# Patient Record
Sex: MALE | Race: WHITE
[De-identification: names, ages, dates, MRNs, and addresses within clinical notes are randomized per-mention and may not be internally consistent; named-entity substitution may affect disease eponyms.]

---

## 2020-04-15 ENCOUNTER — HOSPITAL ENCOUNTER (INPATIENT)
Dept: HOSPITAL 47 - EC | Age: 25
LOS: 3 days | Discharge: HOME | DRG: 603 | End: 2020-04-18
Attending: INTERNAL MEDICINE | Admitting: INTERNAL MEDICINE
Payer: MEDICAID

## 2020-04-15 VITALS — BODY MASS INDEX: 18.8 KG/M2

## 2020-04-15 DIAGNOSIS — Z91.018: ICD-10-CM

## 2020-04-15 DIAGNOSIS — Z88.1: ICD-10-CM

## 2020-04-15 DIAGNOSIS — Z82.49: ICD-10-CM

## 2020-04-15 DIAGNOSIS — F31.9: ICD-10-CM

## 2020-04-15 DIAGNOSIS — Z79.899: ICD-10-CM

## 2020-04-15 DIAGNOSIS — L03.114: Primary | ICD-10-CM

## 2020-04-15 DIAGNOSIS — J45.909: ICD-10-CM

## 2020-04-15 DIAGNOSIS — Z87.09: ICD-10-CM

## 2020-04-15 LAB
ALBUMIN SERPL-MCNC: 4.7 G/DL (ref 3.5–5)
ALP SERPL-CCNC: 94 U/L (ref 38–126)
ALT SERPL-CCNC: 15 U/L (ref 4–49)
ANION GAP SERPL CALC-SCNC: 9 MMOL/L
AST SERPL-CCNC: 39 U/L (ref 17–59)
BASOPHILS # BLD AUTO: 0 K/UL (ref 0–0.2)
BASOPHILS NFR BLD AUTO: 0 %
BUN SERPL-SCNC: 13 MG/DL (ref 9–20)
CALCIUM SPEC-MCNC: 9.3 MG/DL (ref 8.4–10.2)
CHLORIDE SERPL-SCNC: 104 MMOL/L (ref 98–107)
CO2 SERPL-SCNC: 25 MMOL/L (ref 22–30)
EOSINOPHIL # BLD AUTO: 0.1 K/UL (ref 0–0.7)
EOSINOPHIL NFR BLD AUTO: 2 %
ERYTHROCYTE [DISTWIDTH] IN BLOOD BY AUTOMATED COUNT: 5.27 M/UL (ref 4.3–5.9)
ERYTHROCYTE [DISTWIDTH] IN BLOOD: 14.3 % (ref 11.5–15.5)
GLUCOSE SERPL-MCNC: 96 MG/DL (ref 74–99)
HCT VFR BLD AUTO: 42.5 % (ref 39–53)
HGB BLD-MCNC: 14.1 GM/DL (ref 13–17.5)
LYMPHOCYTES # SPEC AUTO: 1.5 K/UL (ref 1–4.8)
LYMPHOCYTES NFR SPEC AUTO: 29 %
MCH RBC QN AUTO: 26.8 PG (ref 25–35)
MCHC RBC AUTO-ENTMCNC: 33.2 G/DL (ref 31–37)
MCV RBC AUTO: 80.6 FL (ref 80–100)
MONOCYTES # BLD AUTO: 0.3 K/UL (ref 0–1)
MONOCYTES NFR BLD AUTO: 6 %
NEUTROPHILS # BLD AUTO: 3.1 K/UL (ref 1.3–7.7)
NEUTROPHILS NFR BLD AUTO: 62 %
PLATELET # BLD AUTO: 68 K/UL (ref 150–450)
POTASSIUM SERPL-SCNC: 4.4 MMOL/L (ref 3.5–5.1)
PROT SERPL-MCNC: 8.1 G/DL (ref 6.3–8.2)
SODIUM SERPL-SCNC: 138 MMOL/L (ref 137–145)
WBC # BLD AUTO: 5.1 K/UL (ref 3.8–10.6)

## 2020-04-15 PROCEDURE — 80053 COMPREHEN METABOLIC PANEL: CPT

## 2020-04-15 PROCEDURE — 76937 US GUIDE VASCULAR ACCESS: CPT

## 2020-04-15 PROCEDURE — 80202 ASSAY OF VANCOMYCIN: CPT

## 2020-04-15 PROCEDURE — 86140 C-REACTIVE PROTEIN: CPT

## 2020-04-15 PROCEDURE — 82565 ASSAY OF CREATININE: CPT

## 2020-04-15 PROCEDURE — 96365 THER/PROPH/DIAG IV INF INIT: CPT

## 2020-04-15 PROCEDURE — 36415 COLL VENOUS BLD VENIPUNCTURE: CPT

## 2020-04-15 PROCEDURE — 36410 VNPNXR 3YR/> PHY/QHP DX/THER: CPT

## 2020-04-15 PROCEDURE — 84439 ASSAY OF FREE THYROXINE: CPT

## 2020-04-15 PROCEDURE — 99284 EMERGENCY DEPT VISIT MOD MDM: CPT

## 2020-04-15 PROCEDURE — 84443 ASSAY THYROID STIM HORMONE: CPT

## 2020-04-15 PROCEDURE — 96366 THER/PROPH/DIAG IV INF ADDON: CPT

## 2020-04-15 PROCEDURE — 87040 BLOOD CULTURE FOR BACTERIA: CPT

## 2020-04-15 PROCEDURE — 85025 COMPLETE CBC W/AUTO DIFF WBC: CPT

## 2020-04-15 RX ADMIN — KETOROLAC TROMETHAMINE PRN MG: 30 INJECTION, SOLUTION INTRAMUSCULAR at 23:14

## 2020-04-15 NOTE — P.HPIM
History of Present Illness


H&P Date: 04/15/20


Chief Complaint: left hand swelling and reddness





24 year old male with history of IV drug abuse





patient admits to relapsing last week , when he trying to inject oxycodone but 

missed the vein of his forearm. he admits to having bactremia in the past due to

IVDA


he claims that he was fine up until 2 days ago when he noticed swelling and 

erythema of his left hand with progressive swelling with painful and limited 

range of motion of his  left hand fingers. he denies any numbness or tingling 

over his left hand, denies fever, but reports chills. he denies any history of 

endocarditis. he otherwise denies any other injuries to his left hand, denies 

any URI symptoms , abd pain , nausea or vomiting, denies any chest pain or 

trouble breathing. denies sick contact with others. denies any visions changes, 

or urinary symptoms. denies abd pain , or changes in bowel habits. 





patient reports shooting pain 10/10 in severity when he tries to move his left 

fingers radaiting proximally to his left forearm. 





Review of Systems





 











Pertinent positives as noted in HPI. All other systems were reviewed and are 

negative


 





Past Medical History


Past Medical History: No Reported History


Additional Past Medical History / Comment(s): IVDA


History of Any Multi-Drug Resistant Organisms: None Reported


Past Surgical History: No Surgical Hx Reported


Past Psychological History: Bipolar


Smoking Status: Never smoker


Past Alcohol Use History: Rare


Past Drug Use History: IV Drug Use, Marijuana, Opiates





- Past Family History


  ** Mother


Family Medical History: Hypertension


Additional Family Medical History / Comment(s): Kidney issue, OCD/Bipolar and 

other mental health issues





Medications and Allergies


                                Home Medications











 Medication  Instructions  Recorded  Confirmed  Type


 


Buprenorphine HCl/Naloxone HCl 1 tab SL BID 04/15/20 04/15/20 History





[Zubsolv 5.7-1.4 mg Tablet Sl]    


 


lamoTRIgine 150 mg PO DAILY 04/15/20 04/15/20 History








                                    Allergies











Allergy/AdvReac Type Severity Reaction Status Date / Time


 


tree nut Allergy  Anaphylaxis Verified 04/15/20 20:42


 


vancomycin AdvReac  Rapid Verified 04/15/20 22:08





   Heart Rate  














Physical Exam


Vitals: 


                                   Vital Signs











  Temp Pulse Resp BP Pulse Ox


 


 04/15/20 20:00  98 F  60  20  136/99  99


 


 04/15/20 16:10  97.9 F  62  17  134/88  100








                                Intake and Output











 04/15/20 04/15/20 04/15/20





 06:59 14:59 22:59


 


Other:   


 


  Weight   70.307 kg














Constitutional:          No acute distress, conversant, pleasant


Eyes:      Anicteric sclerae, moist conjunctiva, no lid-lag


         Pupils equal round reactive to light





ENMT:      NC/AT


         Oropharynx clear, no erythema, or exudates





Neck:      Supple, FROM, no masses, or JVD


         No carotid bruits


         No thyromegaly





Lungs:      Clear to auscultation


         Clear to percussion


         Normal respiratory effort, no accessory muscle use 





Cardiovascular:      Heart regular in rate and rhythm, 


         No murmurs, gallops, or rubs


         No peripheral edema





Abdominal:       Soft


         Nontender, no guarding, rebound or rigidity


         Abdomen moving with respiration


         Normoactive bowel sounds


         No hepatomegaly, No splenomegaly


         No palpable mass 


         No abdominal wall hernia noted 





Skin:      Normal temperature, tone, texture, turgor


         No induration


         No subcutaneous nodules 


         No rash, lesions


         No ulcers





Extremities:      erythema and swelling over the left hand limited range of 

motion of his left fingers, capillary refill immediate , no open wounds no 

drainage. 


         No digital cyanosis 


         No clubbing


         Pedal pulses intact and symmetrical


         Radial pulses intact and symmetrical 


         No calf tenderness 





Psychiatric:      Alert and oriented to person, place and time


         Appropriate affect


         fair judgement   


      


Neuro      Muscles Strength 5/5 in all 4 extremities , limitation over left hand

due to above


         Sensation to light touch grossly present throughout


         Cranial nerves II-XII grossly intact


         No focal sensory deficits


Lymphatics:       no palpable cervical or supraclavicular , or inguinal lymph 

nodes  





Results


CBC & Chem 7: 


                                 04/15/20 18:25





                                 04/15/20 19:42


Labs: 


                  Abnormal Lab Results - Last 24 Hours (Table)











  04/15/20 04/15/20 Range/Units





  18:25 19:42 


 


Plt Count  68 L   (150-450)  k/uL


 


Creatinine   0.65 L  (0.66-1.25)  mg/dL














Assessment and Plan


Assessment: 





24 year old male with IVDA comes in after missing a vein during IV drug use, a 

week ago, and now coming with swelling and erythema over left hand of 2 days 

duration . suspected cellulitis anticipated length of stay > 2 midnights. 





cellulitis of the left hand 2/2 IVDA 


follow up blood cultures


empiric use of vancomycin


monitor for red man syndrome which patient reports in the past. 


toradol for pain control and swelling 


elevated left hand


tylenol for fever


IVF hydration with normal saline. 


patient counseled to avoid drug of abuse


hand surgery consult 





DVT ppx mechanical 


 


CODE STATUS:full code


Discussed with: Patient, ER, RN


Anticipated length of stay  > than 2 midnights


Anticipated discharge place: home


A total of 70 minutes was spent on the care of this complex patient more than 

50% of the time was spent in counseling and care coordination.

## 2020-04-15 NOTE — ED
General Adult HPI





- General


Source: patient


Mode of arrival: ambulatory


Limitations: no limitations





<Arcelia Valadez - Last Filed: 04/15/20 20:15>





<Sumi Castrejon - Last Filed: 04/18/20 13:23>





- General


Chief complaint: Extremity Injury, Upper


Stated complaint: lt hand infection


Time Seen by Provider: 04/15/20 16:49





- History of Present Illness


Initial comments: 


24-year-old male patient presents to the emergency department today for 

evaluation of left hand pain, swelling, redness.  Patient states that symptoms 

started 2-3 days ago after he shot up with heroin.  Patient states that he has 

significant pain to his thumb especially with any type of movement.  Denies 

numbness or tingling to the hand.  He denies any fever or chills.  Denies any 

other medical problems. Patient denies any recent rash, cough, shortness of 

breath, chest pain, abdominal pain, nausea, vomiting, diarrhea, constipation, 

back pain, numbness, tingling, dizziness, weakness, hematuria, dysuria, urinary 

urgency, urinary frequency, headache, visual changes, or any other complaints.


 (Arcelia Valadez)





- Related Data


                                Home Medications











 Medication  Instructions  Recorded  Confirmed


 


Buprenorphine HCl/Naloxone HCl 1 tab SL BID 04/15/20 04/15/20





[Zubsolv 5.7-1.4 mg Tablet Sl]   


 


lamoTRIgine 150 mg PO DAILY 04/15/20 04/15/20











                                    Allergies











Allergy/AdvReac Type Severity Reaction Status Date / Time


 


tree nut Allergy  Anaphylaxis Verified 04/15/20 20:42


 


vancomycin AdvReac  Rapid Verified 04/15/20 22:08





   Heart Rate  














Review of Systems


ROS Other: All systems not noted in ROS Statement are negative.





<Arcelia Valadez - Last Filed: 04/15/20 20:15>


ROS Other: All systems not noted in ROS Statement are negative.





<Sumi Castrejon - Last Filed: 04/18/20 13:23>


ROS Statement: 


Those systems with pertinent positive or pertinent negative responses have been 

documented in the HPI.








Past Medical History


Past Medical History: No Reported History


History of Any Multi-Drug Resistant Organisms: None Reported


Past Surgical History: No Surgical Hx Reported


Past Psychological History: Bipolar


Smoking Status: Never smoker


Past Alcohol Use History: Rare


Past Drug Use History: IV Drug Use, Marijuana, Opiates





<Arcelia Valadez M - Last Filed: 04/15/20 20:15>





General Exam


Limitations: no limitations


General appearance: alert, in no apparent distress, other (This is a well-

developed, well-nourished adult male patient in no acute distress.  Vital signs 

upon presentation are temperature 97.9F, pulse 62, respirations 17, blood 

pressure 134/88, pulse ox 100% on room air.)


Eye exam: Present: normal appearance, PERRL, EOMI.  Absent: scleral icterus, 

conjunctival injection, periorbital swelling


ENT exam: Present: normal exam, normal oropharynx, mucous membranes moist


Respiratory exam: Present: normal lung sounds bilaterally.  Absent: respiratory 

distress, wheezes, rales, rhonchi, stridor


Cardiovascular Exam: Present: regular rate, normal rhythm, normal heart sounds. 

Absent: systolic murmur, diastolic murmur, rubs, gallop, clicks


GI/Abdominal exam: Present: soft, normal bowel sounds.  Absent: distended, 

tenderness, guarding, rebound, rigid


Extremities exam: Present: full ROM, tenderness (Left thumb, left thenar 

eminence), normal capillary refill, other (There is soft tissue swelling left 

hand, erythema overlying. Swelling is worse over the thenar eminence and the 

thumb. ).  Absent: normal inspection, pedal edema, joint swelling, calf 

tenderness


Neurological exam: Present: alert, oriented X3, CN II-XII intact


Psychiatric exam: Present: normal affect, normal mood


Skin exam: Present: warm, dry, intact, normal color.  Absent: rash





<Arcelia Valadez M - Last Filed: 04/15/20 20:15>





Course





                                   Vital Signs











  04/15/20 04/15/20





  16:10 20:00


 


Temperature 97.9 F 98 F


 


Pulse Rate 62 60


 


Respiratory 17 20





Rate  


 


Blood Pressure 134/88 136/99


 


O2 Sat by Pulse 100 99





Oximetry  














Medical Decision Making





- Lab Data


Result diagrams: 


                                 04/15/20 18:25





                                 04/15/20 19:42





<Arcelia Valadez - Last Filed: 04/15/20 20:15>





- Lab Data


Result diagrams: 


                                 04/18/20 06:16





                                 04/18/20 06:16





<Sumi Castrejon - Last Filed: 04/18/20 13:23>





- Medical Decision Making





24-year-old pleasant male patient presents to the emergency department today for

evaluation of left hand redness, swelling, pain.  Physical examination did 

reveal swelling over the left hand more significant over the left thenar 

eminence in the thumb.  There is fusiform tenderness over the thumb.  Patient 

does have a history of IV drug use and admits to shooting up heroin 3 days ago. 

Labs reviewed and revealed normal white blood cell count.  We'll admit to the 

hospital for IV antibiotics. Dr. Hinkle is the patient's primary care physician 

he requests [facility] physician group. Dr. Jiménez accepts admission and requests 

consult to hand surgery.  (Arcelia Valadez)


I was available for consultation in the emergency department.  The history and 

physical exam were done by the midlevel provider. I was consulted for this 

patients care. I reviewed the case with the midlevel provider and based on 

their presentation of the patient, I agree with the assessment, medical decision

making and plan of care as documented. I evaluated the patient myself and agree 

to hospital admission.


Chart was dictated using Dragon dictation software.  Attempts were made to 

correct any dictation errors however some typographical errors may persist. 


Patient was seen during a national state of emergency due to the Covid-19 

pandemic. 


 (Sumi Castrejon)





- Lab Data





                                   Lab Results











  04/15/20 04/15/20 04/16/20 Range/Units





  18:25 19:42 06:20 


 


WBC  5.1   6.9  (3.8-10.6)  k/uL


 


RBC  5.27   4.59  (4.30-5.90)  m/uL


 


Hgb  14.1   12.3 L  (13.0-17.5)  gm/dL


 


Hct  42.5   37.2 L  (39.0-53.0)  %


 


MCV  80.6   81.0  (80.0-100.0)  fL


 


MCH  26.8   26.7  (25.0-35.0)  pg


 


MCHC  33.2   32.9  (31.0-37.0)  g/dL


 


RDW  14.3   14.7  (11.5-15.5)  %


 


Plt Count  68 L   171  D  (150-450)  k/uL


 


Neutrophils %  62   58  %


 


Lymphocytes %  29   30  %


 


Monocytes %  6   7  %


 


Eosinophils %  2   2  %


 


Basophils %  0   1  %


 


Neutrophils #  3.1   4.0  (1.3-7.7)  k/uL


 


Lymphocytes #  1.5   2.1  (1.0-4.8)  k/uL


 


Monocytes #  0.3   0.5  (0-1.0)  k/uL


 


Eosinophils #  0.1   0.1  (0-0.7)  k/uL


 


Basophils #  0.0   0.0  (0-0.2)  k/uL


 


Manual Slide Review  Performed    


 


Sodium   138   (137-145)  mmol/L


 


Potassium   4.4   (3.5-5.1)  mmol/L


 


Chloride   104   ()  mmol/L


 


Carbon Dioxide   25   (22-30)  mmol/L


 


Anion Gap   9   mmol/L


 


BUN   13   (9-20)  mg/dL


 


Creatinine   0.65 L   (0.66-1.25)  mg/dL


 


Est GFR (CKD-EPI)AfAm   >90   (>60 ml/min/1.73 sqM)  


 


Est GFR (CKD-EPI)NonAf   >90   (>60 ml/min/1.73 sqM)  


 


Glucose   96   (74-99)  mg/dL


 


Calcium   9.3   (8.4-10.2)  mg/dL


 


Total Bilirubin   0.5   (0.2-1.3)  mg/dL


 


AST   39   (17-59)  U/L


 


ALT   15   (4-49)  U/L


 


Alkaline Phosphatase   94   ()  U/L


 


Total Protein   8.1   (6.3-8.2)  g/dL


 


Albumin   4.7   (3.5-5.0)  g/dL


 


TSH     (0.465-4.680)  mIU/L


 


Free T4     (0.78-2.19)  ng/dL


 


Vancomycin Trough     ug/mL














  04/16/20 04/17/20 04/17/20 Range/Units





  06:20 07:08 07:08 


 


WBC     (3.8-10.6)  k/uL


 


RBC     (4.30-5.90)  m/uL


 


Hgb     (13.0-17.5)  gm/dL


 


Hct     (39.0-53.0)  %


 


MCV     (80.0-100.0)  fL


 


MCH     (25.0-35.0)  pg


 


MCHC     (31.0-37.0)  g/dL


 


RDW     (11.5-15.5)  %


 


Plt Count     (150-450)  k/uL


 


Neutrophils %     %


 


Lymphocytes %     %


 


Monocytes %     %


 


Eosinophils %     %


 


Basophils %     %


 


Neutrophils #     (1.3-7.7)  k/uL


 


Lymphocytes #     (1.0-4.8)  k/uL


 


Monocytes #     (0-1.0)  k/uL


 


Eosinophils #     (0-0.7)  k/uL


 


Basophils #     (0-0.2)  k/uL


 


Manual Slide Review     


 


Sodium  141    (137-145)  mmol/L


 


Potassium  4.1    (3.5-5.1)  mmol/L


 


Chloride  105    ()  mmol/L


 


Carbon Dioxide  25    (22-30)  mmol/L


 


Anion Gap  11    mmol/L


 


BUN  13    (9-20)  mg/dL


 


Creatinine  0.76    (0.66-1.25)  mg/dL


 


Est GFR (CKD-EPI)AfAm  >90    (>60 ml/min/1.73 sqM)  


 


Est GFR (CKD-EPI)NonAf  >90    (>60 ml/min/1.73 sqM)  


 


Glucose  87    (74-99)  mg/dL


 


Calcium  9.0    (8.4-10.2)  mg/dL


 


Total Bilirubin  0.5    (0.2-1.3)  mg/dL


 


AST  41    (17-59)  U/L


 


ALT  14    (4-49)  U/L


 


Alkaline Phosphatase  77    ()  U/L


 


Total Protein  6.9    (6.3-8.2)  g/dL


 


Albumin  4.0    (3.5-5.0)  g/dL


 


TSH    5.540 H  (0.465-4.680)  mIU/L


 


Free T4    1.36  (0.78-2.19)  ng/dL


 


Vancomycin Trough   13.9   ug/mL














Disposition


Decision to Admit Reason: Admit from EC


Decision Date: 04/15/20


Decision Time: 20:21





<Arcelia Valadez - Last Filed: 04/15/20 20:15>





<Sumi Castrejon - Last Filed: 04/18/20 13:23>


Clinical Impression: 


 Cellulitis of left hand





Disposition: ADMITTED AS IP TO THIS [facility]


Condition: Serious

## 2020-04-16 LAB
ALBUMIN SERPL-MCNC: 4 G/DL (ref 3.5–5)
ALP SERPL-CCNC: 77 U/L (ref 38–126)
ALT SERPL-CCNC: 14 U/L (ref 4–49)
ANION GAP SERPL CALC-SCNC: 11 MMOL/L
AST SERPL-CCNC: 41 U/L (ref 17–59)
BASOPHILS # BLD AUTO: 0 K/UL (ref 0–0.2)
BASOPHILS NFR BLD AUTO: 1 %
BUN SERPL-SCNC: 13 MG/DL (ref 9–20)
CALCIUM SPEC-MCNC: 9 MG/DL (ref 8.4–10.2)
CHLORIDE SERPL-SCNC: 105 MMOL/L (ref 98–107)
CO2 SERPL-SCNC: 25 MMOL/L (ref 22–30)
EOSINOPHIL # BLD AUTO: 0.1 K/UL (ref 0–0.7)
EOSINOPHIL NFR BLD AUTO: 2 %
ERYTHROCYTE [DISTWIDTH] IN BLOOD BY AUTOMATED COUNT: 4.59 M/UL (ref 4.3–5.9)
ERYTHROCYTE [DISTWIDTH] IN BLOOD: 14.7 % (ref 11.5–15.5)
GLUCOSE SERPL-MCNC: 87 MG/DL (ref 74–99)
HCT VFR BLD AUTO: 37.2 % (ref 39–53)
HGB BLD-MCNC: 12.3 GM/DL (ref 13–17.5)
LYMPHOCYTES # SPEC AUTO: 2.1 K/UL (ref 1–4.8)
LYMPHOCYTES NFR SPEC AUTO: 30 %
MCH RBC QN AUTO: 26.7 PG (ref 25–35)
MCHC RBC AUTO-ENTMCNC: 32.9 G/DL (ref 31–37)
MCV RBC AUTO: 81 FL (ref 80–100)
MONOCYTES # BLD AUTO: 0.5 K/UL (ref 0–1)
MONOCYTES NFR BLD AUTO: 7 %
NEUTROPHILS # BLD AUTO: 4 K/UL (ref 1.3–7.7)
NEUTROPHILS NFR BLD AUTO: 58 %
PLATELET # BLD AUTO: 171 K/UL (ref 150–450)
POTASSIUM SERPL-SCNC: 4.1 MMOL/L (ref 3.5–5.1)
PROT SERPL-MCNC: 6.9 G/DL (ref 6.3–8.2)
SODIUM SERPL-SCNC: 141 MMOL/L (ref 137–145)
WBC # BLD AUTO: 6.9 K/UL (ref 3.8–10.6)

## 2020-04-16 PROCEDURE — 05H933Z INSERTION OF INFUSION DEVICE INTO RIGHT BRACHIAL VEIN, PERCUTANEOUS APPROACH: ICD-10-PCS

## 2020-04-16 RX ADMIN — DIPHENHYDRAMINE HYDROCHLORIDE PRN MG: 50 INJECTION, SOLUTION INTRAMUSCULAR; INTRAVENOUS at 14:42

## 2020-04-16 RX ADMIN — SODIUM CHLORIDE SCH MLS/HR: 9 INJECTION, SOLUTION INTRAVENOUS at 14:42

## 2020-04-16 RX ADMIN — SODIUM CHLORIDE SCH MLS/HR: 9 INJECTION, SOLUTION INTRAVENOUS at 02:18

## 2020-04-16 RX ADMIN — KETOROLAC TROMETHAMINE PRN MG: 30 INJECTION, SOLUTION INTRAMUSCULAR at 14:41

## 2020-04-16 RX ADMIN — DIPHENHYDRAMINE HYDROCHLORIDE PRN MG: 50 INJECTION, SOLUTION INTRAMUSCULAR; INTRAVENOUS at 22:49

## 2020-04-16 RX ADMIN — CEFAZOLIN SCH MLS/HR: 330 INJECTION, POWDER, FOR SOLUTION INTRAMUSCULAR; INTRAVENOUS at 20:35

## 2020-04-16 RX ADMIN — CEFAZOLIN SCH: 330 INJECTION, POWDER, FOR SOLUTION INTRAMUSCULAR; INTRAVENOUS at 07:40

## 2020-04-16 RX ADMIN — SODIUM CHLORIDE SCH MLS/HR: 9 INJECTION, SOLUTION INTRAVENOUS at 22:50

## 2020-04-16 RX ADMIN — DIPHENHYDRAMINE HYDROCHLORIDE PRN MG: 50 INJECTION, SOLUTION INTRAMUSCULAR; INTRAVENOUS at 00:15

## 2020-04-16 RX ADMIN — CEFAZOLIN SCH MLS/HR: 330 INJECTION, POWDER, FOR SOLUTION INTRAMUSCULAR; INTRAVENOUS at 00:15

## 2020-04-16 RX ADMIN — ASPIRIN SCH EACH: 325 TABLET ORAL at 17:55

## 2020-04-16 RX ADMIN — ASPIRIN SCH EACH: 325 TABLET ORAL at 11:03

## 2020-04-16 RX ADMIN — CEFAZOLIN SCH: 330 INJECTION, POWDER, FOR SOLUTION INTRAMUSCULAR; INTRAVENOUS at 18:02

## 2020-04-16 NOTE — P.CNOR
History of Present Illness





- \[facility] [place] [facility]\""


Consult date: 04/16/20


Consult reason: other


History of present illness: 





Patient is a 24-year-old male who presented to [facility] 

yesterday evening with regards to pain and swelling of his left hand.  Patient 

has a known history of IV drug abuse, he recently relapsed 2-3 days ago and 

injected into his left forearm.  He subsequently developed pain and swelling of 

the hand and fingers on the left side.  Initially it was the entire hand, since 

being admitted to the hospital and receiving some IV antibiotics this has im

proved throughout most of the hand except the thumb region.  Patient admits to 

having previous abscesses from IV drug use, he's underwent I&D procedures at 

urgent care.





Patient denies any previous surgery involving the left hand or wrist.  Again he 

localizes most of the pain to the thenar eminence region of the left hand.  He 

denies any numbness or tingling of the hand.  He has good sensation throughout 

the entire hand and wrist.  Besides the recent  use of drugs, he denies any 

trauma to the left upper extremity.  Currently patient denies any fevers, 

chills, episodes of nausea vomiting.





Review of Systems


Constitutional: Reports as per HPI





Past Medical History


Past Medical History: No Reported History


Additional Past Medical History / Comment(s): Asthma as a child, IV drug 

addiction, past "blood infection" from dental procedure.


History of Any Multi-Drug Resistant Organisms: None Reported


Past Surgical History: No Surgical Hx Reported


Additional Past Surgical History / Comment(s): Tonsils


Past Psychological History: Bipolar


Smoking Status: Never smoker


Past Alcohol Use History: Rare


Additional Past Alcohol Use History / Comment(s): Once a year


Past Drug Use History: IV Drug Use, Marijuana, Opiates


Additional Drug Use History / Comment(s): Smokes every couple days.  Patient had

been clean for 1 year and 6 days ago did IV drug use and now hand is infected.





- Past Family History


  ** Mother


Family Medical History: Hypertension


Additional Family Medical History / Comment(s): Kidney issue, OCD/Bipolar and 

other mental health issues





Medications and Allergies


                                Home Medications











 Medication  Instructions  Recorded  Confirmed  Type


 


Buprenorphine HCl/Naloxone HCl 1 tab SL BID 04/15/20 04/15/20 History





[Zubsolv 5.7-1.4 mg Tablet Sl]    


 


lamoTRIgine 150 mg PO DAILY 04/15/20 04/15/20 History








                                    Allergies











Allergy/AdvReac Type Severity Reaction Status Date / Time


 


tree nut Allergy  Anaphylaxis Verified 04/15/20 20:42


 


vancomycin AdvReac  Rapid Verified 04/15/20 22:08





   Heart Rate  














Physical Examination





Left upper extremity:





No obvious open lesions or sores present throughout the upper extremity


There is some ecchymosis noted in the mid forearm region at the site of 

injection, there is no significant tenderness with palpation in that area.  I'm 

unable to appreciate any fluctuance in that area


Exam of the hand, there is generalized erythema noted more in the thenar 

eminence aspect of the thumb.  There is also notable soft tissue swelling on the

palmar and dorsal side of the hand in the thumb region


He is able to extend and flex digits 2 through 5 with no difficulty or pain 

reproduced, there is some  discomfort when he extends and flexes the thumb.


His sensation to light touch throughout the extremity is intact, his radial 

pulses 2+





Results





- Labs


Labs: 


                  Abnormal Lab Results - Last 24 Hours (Table)











  04/15/20 04/15/20 04/16/20 Range/Units





  18:25 19:42 06:20 


 


Hgb    12.3 L  (13.0-17.5)  gm/dL


 


Hct    37.2 L  (39.0-53.0)  %


 


Plt Count  68 L    (150-450)  k/uL


 


Creatinine   0.65 L   (0.66-1.25)  mg/dL








                                      H & H











  04/15/20 04/16/20 Range/Units





  18:25 06:20 


 


Hgb  14.1  12.3 L  (13.0-17.5)  gm/dL


 


Hct  42.5  37.2 L  (39.0-53.0)  %











Result Diagrams: 


                                 04/16/20 06:20





                                 04/16/20 06:20





Assessment and Plan


Plan: 





Assessment:





Left hand pain/swelling


Left hand cellulitis


Recent use of IV drugs, history of IV drug abuse





Plan:





I was able to discuss the case, including both physical exam findings and 

imaging studies my attending Dr. Khan.  Taking into consideration the 

patient's lab results of a normal white count and also the improvement in his 

physical exam with the current use of IV antibiotics, we would like to continue 

conservative management 





Recommend icing and basic range of motion exercises of the hand, avoid excess 

use





No obvious evidence of flexor tenosynovium by this at this time





No obvious evidence of localized abscess involving the left hand





Patient will be followed daily to check for worsening/improvement of current 

symptoms and reevaluation of treatment





Other medical stressed recommendations








Time with Patient: Less than 30

## 2020-04-16 NOTE — CONS
CONSULTATION



PULMONARY/CRITICAL CARE CONSULTATION:



REASON FOR CONSULTATION:

Cellulitis.



DATE OF SERVICE:

04/16/2020



A 24-year-old male, well known to me.  For a long period of time, I was his primary

care physician.  He does have a history of underlying mild asthma, which does not

require treatment at this time.  Anyway, he apparently was seen at the Urgent Care in

[place] just a few days ago, maybe a week or so ago, maybe a bit longer for an

infection in his left hand.  At that time, he received Keflex 500 mg 3 times a day for

5 days.  He states that initially it seemed like his hand improved.  He describes pain,

tenderness, swelling of his left hand, left wrist, left forearm all the way up to the

elbow.  He came into the emergency room yesterday to be evaluated.  He states that it

is quite painful to the touch, particularly in the left forearm area.  It also is

tingly in his hand.  There is no fever or chills.  The patient does have a history of

previous IV drug abuse.  More recently, he states that he has not been shooting up with

any drugs, but rather he draws blood out of the vein and reinject the blood in his

vein.  Anyway, he denies doing IV drugs at this time. .



ALLERGIES:

Include TREE NUTS.



MEDICATIONS:

At home include I believe Suboxone and Lamictal.



PRIMARY CARE PHYSICIAN:

Dr. Ying, used to be Dr. Murphy.



MEDICAL HISTORY:

Otherwise unremarkable other than the mild asthma, which he does not require treatment

for anymore.



SURGICAL HISTORY:

Unremarkable.



He also has a history of bipolar disorder.  Nonsmoker.  Uses alcohol rarely.  Has used

opiates, IV drugs in the past and marijuana.



FAMILY HISTORY:

Unremarkable.  Mother is healthy, who has a history of cat scratch fever and father has

a history of chronic back pain.



REVIEW OF SYSTEMS:

CONSTITUTIONAL: Negative.

NEUROLOGIC:  Negative.

HEENT: Negative.

CARDIOVASCULAR:  Negative.

PULMONARY:  Negative.

GI: Negative.

: Negative.

RHEUMATOLOGIC:  Negative.

IMMUNOLOGIC: Negative.

ENDOCRINOLOGIC:  Negative.

DERMATOLOGIC:  Negative.

MUSCULOSKELETAL:  Pain, tenderness, swelling, erythema, warmth of the left hand, wrist,

forearm all the way up to the elbow.



Current vital signs are reviewed. Temperature is 97.6, heart rate 60, respiratory rate

16, blood pressure 108/60 with mean 76, room air saturation 96%.  Appears in no acute

distress.

HEENT: Examination is grossly unremarkable.

NECK:  Supple, full range of motion.  No adenopathy or thyromegaly.  No neck vein

distention.

CARDIOVASCULAR: Examination reveals regular rhythm and rate.  Heart rate 60 beats per

minute.  S1, S2 normal.  There is no murmur.

LUNGS:  Reveal clear breath sounds equal.

ABDOMEN:  Soft. Bowel sounds are heard.

EXTREMITIES:  Normal except for the left upper extremity.  As mentioned, there is pain

and tenderness on palpation particularly to the left forearm.  The left hand and wrist

are quite swollen.  Mobility is reduced.  There is an area in the distal left forearm

that is more raised and erythematous.  I do not feel any axillary adenopathy.  There is

no lymphangitic streaking.

Extremities otherwise normal.

SKIN: Without rash.

NEUROLOGIC: Examination is brief but nonfocal.



LABS:

Reviewed.  White count 6.9, hemoglobin 12.3, hematocrit 37.2, platelet count normal.

Sodium, potassium, chloride, CO2 normal. BUN and creatinine normal.  The electrolyte

profile otherwise unremarkable.

I do not see that a drug screen was done and it probably should have been done.



No x-rays have been done.  Hand surgeon has been consulted as has the ID doctor.



Medications currently include Tylenol, Benadryl, Toradol, Lamictal, Narcan, and

vancomycin.



ASSESSMENT:

1. Left hand, wrist and forearm cellulitis, presumably secondary to either use of IV

    drugs and/or injection of the patient's own blood.

2. History of bipolar disorder.

3. History of previous drug abuse.

4. History of mild asthma, which is not requiring treatment at this time.



PLAN:

Will await input from Infectious Disease and orthopedist.  Additional recommendations

and suggestions are forthcoming.  Will follow along.  Prognosis is guarded.  The

patient denies taking any IV drugs at this time.  A confirmatory blood drug screen

might be in order.  I will leave that up to the primary.  No additional recommendations

are made.  His current primary care physician is Dr. Ying.





MMJOIEL / KIARAN: [id number] / Job#: [id number]

## 2020-04-16 NOTE — P.PN
Subjective


Progress Note Date: 04/16/20


Principal diagnosis: 





Left hand cellulitis





Patient is a 24-year-old male with history of IV drug abuse who states he's 

reinjected his Inez missed Avakian a week ago on presented with swelling and 

some erythema over his left hand 2 days.  Patient started on IV antibiotics 

states his pain was improving but he was seen by me earlier today





Objective





- Vital Signs


Vital signs: 


                                   Vital Signs











Temp  97.8 F   04/16/20 11:58


 


Pulse  58 L  04/16/20 11:58


 


Resp  17   04/16/20 11:58


 


BP  134/51   04/16/20 11:58


 


Pulse Ox  98   04/16/20 11:58








                                 Intake & Output











 04/15/20 04/16/20 04/16/20





 18:59 06:59 18:59


 


Intake Total  2230 750


 


Balance  2230 750


 


Weight 70.307 kg 70.307 kg 70.307 kg


 


Intake:   


 


  Intake, IV Titration  1030 390





  Amount   


 


    Sodium Chloride 0.9% 1,  780 390





    000 ml @ 130 mls/hr IV .   





    Q7H42M [facility] Rx#:[id number]   


 


    Vancomycin 1,250 mg In  250 





    Sodium Chloride 0.9% 250   





    ml @ 125 mls/hr IVPB Q8H   





    [facility] Rx#:[id number]   


 


  Oral  1200 360


 


Other:   


 


  Voiding Method  Toilet Toilet


 


  # Voids  2 3














- Constitutional


General appearance: Present: no acute distress





- Respiratory


Respiratory: bilateral: CTA





- Cardiovascular


Rhythm: regular





- Gastrointestinal


General gastrointestinal: Present: normal bowel sounds





- Integumentary


Integumentary Comment(s): 





Slight erythema of the left left ability to move his fingers on the left and 

decreased range of motion of the thumb





- Labs


CBC & Chem 7: 


                                 04/16/20 06:20





                                 04/16/20 06:20


Labs: 


                  Abnormal Lab Results - Last 24 Hours (Table)











  04/15/20 04/15/20 04/16/20 Range/Units





  18:25 19:42 06:20 


 


Hgb    12.3 L  (13.0-17.5)  gm/dL


 


Hct    37.2 L  (39.0-53.0)  %


 


Plt Count  68 L    (150-450)  k/uL


 


Creatinine   0.65 L   (0.66-1.25)  mg/dL














Assessment and Plan


(1) Cellulitis of left hand


Narrative/Plan: 


Appreciate orthopedic input, patient is responding to IV antibiotics plan at 

this time is conservative management continue have antibiotics for pain control,

patient does not have IV access midline placement continue vancomycin evidence 

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





Issue IV drug abuse patient on soup self at home he can take his own medications

and is not on formulary


Current Visit: Yes   Status: Acute   Code(s): L03.114 - CELLULITIS OF LEFT UPPER

LIMB   SNOMED Code(s): 37675215

## 2020-04-16 NOTE — CONS
CONSULTATION



DATE OF SERVICE:

04/16/2020



REASON FOR CONSULTATION:

Left forearm and hand abscess.



HISTORY OF PRESENT ILLNESS:

The patient is a 24-year-old  male with past medical history significant for

IV drug use.  Apparently the patient was trying to take oxycodone but he missed the

vein of his forearm and ended up developing swelling and erythema of his left hand. The

patient injected about 3 days ago and the swelling and redness started 2 days later

with progressive swelling and redness involving mostly the base of his left thumb.  He

presented to the ER. The patient is describing the pain to be more of a dull aching to

throbbing, intensity almost 6 to 7 out of 10, and no radiation.  The patient denies

having any drainage.  Denies high-grade fever or chills.  With these symptoms, the

patient was evaluated by the ER physician on arrival in the ER.  The patient has been

afebrile.  He did have a normal white count.  Blood cultures were obtained which are

currently pending.  Patient has been started on vancomycin and admitted to hospital.

Infectious Disease was consulted for further recommendations regarding antibiotic

therapy.



REVIEW OF SYSTEMS:

Positive points have been mentioned in HPI.  Rest of the systems are negative.



PAST MEDICAL HISTORY:

Asthma, IV drug addiction, previous history of abscess and bipolar disorder.



PAST SURGICAL HISTORY:

Tonsillectomy and drainage of an abscess.



SOCIAL HISTORY:

He does admit to IV drug use and marijuana and opiates. Smokes every couple of days.

Rarely drinks.



FAMILY HISTORY:

Hypertension and kidney issues.



ALLERGIES:

TREE NUTS AND VANCOMYCIN, though he has tolerated vancomycin without any problem.



MEDICATIONS:

The patient is currently on vancomycin 1250 q.8 hours.  He is getting IV fluid, Narcan,

Lamictal, Toradol, Benadryl and Tylenol.



PHYSICAL EXAMINATION:

Blood pressure 114/66, pulse of 67, temperature 98. He is 100% on room air.

General description is a young male up in the room in no distress.

HEENT examination shows no pallor or scleral icterus.  Oral mucosa membrane is dry. No

pharyngeal erythema or thrush.

NECK: Trachea is central. No thyromegaly.

LUNGS: Unlabored breathing. Clear to auscultation anteriorly.  No wheeze or crackle.

HEART: S1, S2.  Regular rate and rhythm.

ABDOMEN: Soft. No tenderness. No guarding or rigidity.

EXTREMITIES: No edema of the feet.

Examination of left hand: At the base of the thumb he did have swelling. Slightly

tender to touch, but no significant warmth. Fluctuation but no drainage.

Neurologically the patient is awake, alert, oriented x3.  Mood and affect normal.



LABS:

Hemoglobin is 12.3 with white count 6.9, BUN of 13, creatinine 0.76.  Electrolytes have

been normal. Liver enzymes normal. Blood culture has been negative so far.



DIAGNOSTIC IMPRESSION AND PLAN:

Patient with left hand swelling and redness with concern for possible hematoma versus

an abscess in this patient who with a history of IV drug use, though the patient denied

injection of any drugs; rather, aspirating and then injecting his blood back. Previous

history of abscess. Clinically not behaving as an abscess, with no fever or any

elevated white count and no significant redness.



PLAN:

1. Will keep the patient on vancomycin, Pharmacy to dose, target of 15, while waiting

    for the culture to finalize.

2. Patient has already been seen by Orthopedic; planning for no drainage at this

    point.

3. Will check a CRP and sed rate in the morning and repeat a CBC.

4. We will follow his clinical condition and culture to further adjust medication if

    needed.

Thank you for this consultation.  Will follow this patient along with you.





MMODL / IJN: [id number] / Job#: [id number]

## 2020-04-17 LAB — T4 FREE SERPL-MCNC: 1.36 NG/DL (ref 0.78–2.19)

## 2020-04-17 RX ADMIN — ASPIRIN SCH EACH: 325 TABLET ORAL at 09:36

## 2020-04-17 RX ADMIN — SODIUM CHLORIDE SCH MLS/HR: 9 INJECTION, SOLUTION INTRAVENOUS at 08:03

## 2020-04-17 RX ADMIN — CEFAZOLIN SCH: 330 INJECTION, POWDER, FOR SOLUTION INTRAMUSCULAR; INTRAVENOUS at 10:23

## 2020-04-17 RX ADMIN — ASPIRIN SCH EACH: 325 TABLET ORAL at 18:11

## 2020-04-17 RX ADMIN — SODIUM CHLORIDE SCH MLS/HR: 9 INJECTION, SOLUTION INTRAVENOUS at 15:51

## 2020-04-17 RX ADMIN — DIPHENHYDRAMINE HYDROCHLORIDE PRN MG: 50 INJECTION, SOLUTION INTRAMUSCULAR; INTRAVENOUS at 15:53

## 2020-04-17 RX ADMIN — CEFAZOLIN SCH MLS/HR: 330 INJECTION, POWDER, FOR SOLUTION INTRAMUSCULAR; INTRAVENOUS at 18:11

## 2020-04-17 RX ADMIN — DIPHENHYDRAMINE HYDROCHLORIDE PRN MG: 50 INJECTION, SOLUTION INTRAMUSCULAR; INTRAVENOUS at 08:03

## 2020-04-17 RX ADMIN — CEFAZOLIN SCH: 330 INJECTION, POWDER, FOR SOLUTION INTRAMUSCULAR; INTRAVENOUS at 15:26

## 2020-04-17 RX ADMIN — DIPHENHYDRAMINE HYDROCHLORIDE PRN MG: 50 INJECTION, SOLUTION INTRAMUSCULAR; INTRAVENOUS at 22:18

## 2020-04-17 NOTE — P.PN
Subjective


Progress Note Date: 04/17/20


Principal diagnosis: 





Left wrist cellulitis





Patient is a 24-year-old male who presented with swelling patient has a history 

of IV drug abuse he states that he mistakenly injured his IV drug use he was 

started on IV vancomycin seen by infectious disease and surgery





Objective





- Vital Signs


Vital signs: 


                                   Vital Signs











Temp  97.9 F   04/17/20 11:34


 


Pulse  66   04/17/20 11:34


 


Resp  18   04/17/20 11:34


 


BP  115/64   04/17/20 11:34


 


Pulse Ox  100   04/17/20 11:34








                                 Intake & Output











 04/16/20 04/17/20 04/17/20





 18:59 06:59 18:59


 


Intake Total 990 1430 


 


Balance 990 1430 


 


Weight 70.307 kg  


 


Intake:   


 


  Intake, IV Titration 390 1430 





  Amount   


 


    Sodium Chloride 0.9% 1, 390 1430 





    000 ml @ 130 mls/hr IV .   





    Q7H42M [facility] Rx#:[id number]   


 


  Oral 600  


 


Other:   


 


  Voiding Method Toilet Toilet Toilet


 


  # Voids 3 1 














- Constitutional


General appearance: Present: cooperative





- Respiratory


Respiratory: bilateral: CTA





- Cardiovascular


Rhythm: regular





- Gastrointestinal


General gastrointestinal: Present: normal bowel sounds





- Neurologic


Neurologic: Present: CNII-XII intact





- Musculoskeletal


Musculoskeletal Comment(s): 





Improved swelling and movement of the left hand





- Labs


CBC & Chem 7: 


                                 04/16/20 06:20





                                 04/16/20 06:20


Labs: 


                  Abnormal Lab Results - Last 24 Hours (Table)











  04/17/20 Range/Units





  07:08 


 


TSH  5.540 H  (0.465-4.680)  mIU/L








                      Microbiology - Last 24 Hours (Table)











 04/15/20 18:25 Blood Culture - Preliminary





 Blood    No Growth after 24 hours














Assessment and Plan


(1) Cellulitis of left hand


Current Visit: Yes   Status: Acute   Code(s): L03.114 - CELLULITIS OF LEFT UPPER

LIMB   SNOMED Code(s): 50204755


   


Plan: 





Cellulitis of the right hand, IV drug use: Appreciate surgeon and infectious 

disease input at this time no abscess or tenosynovitis is present we'll continue

IV vancomycin pending final culture results

## 2020-04-17 NOTE — PN
PROGRESS NOTE



DATE OF SERVICE:

04/17/2020



REASON FOR FOLLOWUP:

Left wrist and hand possible abscess and cellulitis.



INTERVAL HISTORY:

The patient is currently afebrile, has been breathing comfortably. Patient denies

having any chest pain or shortness of breath or cough.  No nausea, no vomiting.

Overall pain and swelling to the left wrist and the hand area has decreased.



PHYSICAL EXAMINATION:

Blood pressure is 115/65 with a pulse of 66, temperature 97.9, he is 100% on room air.

General description is a middle-aged male, up in the bed in no distress.

RESPIRATORY SYSTEM: Unlabored breathing, clear to auscultation anteriorly.

HEART:  S1-S2, regular rate and rhythm.

ABDOMEN:  Soft, no tenderness.  Left wrist and the hand area swelling have decreased.

No fluctuation, induration or drainage was noticed.



DIAGNOSTIC IMPRESSION AND PLAN:

Patient with a left wrist and hand cellulitis with concern for possible hematoma versus

an abscess.  The patient clinically may be having more of a hematoma than an abscess.

Will keep the patient on vancomycin while waiting for the culture to finalize and

repeat inflammatory markers for tomorrow. Continue supportive care.





MMODL / IJN: [id number] / Job#: [id number]

## 2020-04-17 NOTE — P.PN
Subjective


Progress Note Date: 04/17/20


Principal diagnosis: 





This is a 24-year-old active gentleman well known to our service, he has been a 

primary care patient of Dr. Arin morejon for quite some time.  He does have a histo

ry of underlying mild asthma, which does not require treatment currently.  He 

was seen at the urgent care in [place] a few days ago for infection in his 

left hand.  At that time he received Keflex 500 mg 3 times a day for 5 days.  

Initially it seemed his hand had improved.  He describes pain, tenderness, 

swelling of the left hand, left wrist, left forearm all the way up to the elbow.

 He came into the emergency room 04/15/2020 to be evaluated and stated it is 

quite painful to the touch, particularly in the left forearm area.  He has 

describes tingling in his hand without any fever or chills.  He does have 

history of previous IV drug abuse.  Most recently, he states that he has not 

been shooting up with any drugs, but rather he draws blood out of the vein and 

re-injects the blood in his vein.





This morning, 04/17/2020, he is seen on the medical surgical unit with Dr. Izaguirre.  He is currently laying in bed sleeping in no acute distress.  Remains 

afebrile, hemodynamically stable, currently on room air with oxygen saturation 

97%.  Blood cultures negative 24 hours.  Labs today TSH 5.540 with free T4 

1.36.  Currently on IV vancomycin.  The patient was seen by orthopedics who 

recommended conservative management, no surgical intervention at this time, 

place to be applied and basic range of motion exercises to be utilized.  

Infectious disease recommends continuing IV vancomycin while waiting for 

cultures to finalize.  No new concerns.





Objective





- Vital Signs


Vital signs: 


                                   Vital Signs











Temp  97.8 F   04/17/20 05:00


 


Pulse  63   04/17/20 05:00


 


Resp  16   04/17/20 05:00


 


BP  111/54   04/17/20 05:00


 


Pulse Ox  97   04/17/20 05:00








                                 Intake & Output











 04/16/20 04/17/20 04/17/20





 18:59 06:59 18:59


 


Intake Total 990 1430 


 


Balance 990 1430 


 


Weight 70.307 kg  


 


Intake:   


 


  Intake, IV Titration 390 1430 





  Amount   


 


    Sodium Chloride 0.9% 1, 390 1430 





    000 ml @ 130 mls/hr IV .   





    Q7H42M [facility] Rx#:[id number]   


 


  Oral 600  


 


Other:   


 


  Voiding Method Toilet Toilet Toilet


 


  # Voids 3 1 














- Constitutional


Constitutional Comment(s): 





Appears comfortable


General appearance: Present: cooperative, no acute distress





- Respiratory


Details: 





Lungs sounds clear bilaterally.  Respirations even, nonlabored.  Currently on 

room air with oxygen saturation 97%.





- Cardiovascular


Details: 





S1, S2 present, no murmur.  Regular rate and rhythm.  Palpable peripheral pulses

bilaterally.  No edema present except for some edema in the left upper e

xtremity.





- Gastrointestinal


Gastrointestinal Comment(s): 





Abdomen soft, nontender, nondistended.  No organomegaly present.  Active bowel 

sounds present 4 quadrants.  Tolerating diet.





- Integumentary


Integumentary Comment(s): 





Skin is warm and dry.  Left hand and wrist are swollen.  No lymphangitic 

streaking present.





- Neurologic


Neurologic: Present: CNII-XII intact





- Musculoskeletal


Musculoskeletal: Present: gait normal, strength equal bilaterally





- Psychiatric


Psychiatric: Present: A&O x's 3, appropriate affect, intact judgment & insight





- Allied health notes


Allied health notes reviewed: nursing





- Labs


CBC & Chem 7: 


                                 04/16/20 06:20





                                 04/16/20 06:20


Labs: 


                  Abnormal Lab Results - Last 24 Hours (Table)











  04/17/20 Range/Units





  07:08 


 


TSH  5.540 H  (0.465-4.680)  mIU/L








                      Microbiology - Last 24 Hours (Table)











 04/15/20 18:25 Blood Culture - Preliminary





 Blood    No Growth after 24 hours














Assessment and Plan


Assessment: 





Left hand, wrist and forearm cellulitis, presumably secondary to either use of 

IV drugs and/or injection of patient's own blood


History of bipolar disorder


History of previous IV drug abuse


History of mild asthma, not currently requiring treatment





Plan: 





Continue antibiotics per infectious disease recommendations


Continue ice, range of motion to left arm per orthopedic recommendations


Continue Toradol and Tylenol for pain control


Stable from a pulmonary standpoint





I, the cosigning physician, performed a history & physical examination of the 

patient. Lungs sounds are clear bilaterally.  Maintaining good O2 saturations in

the 90s on room air.  I discussed the assessment and plan of care with my nurse 

practitioner, Jessica Mcelroy. I attest to the above note as dictated by her.


Time with Patient: Greater than 30

## 2020-04-17 NOTE — P.PN
Subjective


Progress Note Date: 04/17/20


Principal diagnosis: 





Left hand cellulitis





Patient evaluated today at bedside.  He is resting comfortably.  He notes the 

discomfort in the thumb region on the left hand is improved significantly.  He 

notes no acute fevers/chills, episodes of nausea or vomiting.





Objective





- Vital Signs


Vital signs: 


                                   Vital Signs











Temp  97.9 F   04/17/20 11:34


 


Pulse  66   04/17/20 11:34


 


Resp  18   04/17/20 11:34


 


BP  115/64   04/17/20 11:34


 


Pulse Ox  100   04/17/20 11:34








                                 Intake & Output











 04/16/20 04/17/20 04/17/20





 18:59 06:59 18:59


 


Intake Total 990 1430 


 


Balance 990 1430 


 


Weight 70.307 kg  


 


Intake:   


 


  Intake, IV Titration 390 1430 





  Amount   


 


    Sodium Chloride 0.9% 1, 390 1430 





    000 ml @ 130 mls/hr IV .   





    Q7H42M [facility] Rx#:[id number]   


 


  Oral 600  


 


Other:   


 


  Voiding Method Toilet Toilet Toilet


 


  # Voids 3 1 














- Exam





Left upper extremity:


Erythema and soft tissue swelling involving the thenar eminence aspect of the 

left hand has improved significantly.  No open lesions or sores are visualized. 

No areas of fluctuance appreciated .Range of motion of all the fingers with 

regards to extension and flexion are intact, no pain is reproduced with this.  

Sensation to light touch throughout that extremity is intact.  His radial pulses

2+.








- Labs


CBC & Chem 7: 


                                 04/16/20 06:20





                                 04/16/20 06:20


Labs: 


                  Abnormal Lab Results - Last 24 Hours (Table)











  04/17/20 Range/Units





  07:08 


 


TSH  5.540 H  (0.465-4.680)  mIU/L








                      Microbiology - Last 24 Hours (Table)











 04/15/20 18:25 Blood Culture - Preliminary





 Blood    No Growth after 24 hours














Assessment and Plan


Plan: 





Assessment:





Left hand pain/swelling


Left hand cellulitis


Recent use of IV drugs, history of IV drug abuse





Plan:





Continue conservative management, basic range of motion exercises along with 

ice.





No evidence of flexor tenosynovitis/abscess





Recommending continuing IV antibiotics at this time and awaiting final cultures





Other medical stressed recommendations





We'll be available for any further questions regarding this patient








Time with Patient: Less than 30

## 2020-04-18 VITALS
HEART RATE: 62 BPM | SYSTOLIC BLOOD PRESSURE: 113 MMHG | RESPIRATION RATE: 16 BRPM | DIASTOLIC BLOOD PRESSURE: 67 MMHG | TEMPERATURE: 98.7 F

## 2020-04-18 LAB
BASOPHILS # BLD AUTO: 0 K/UL (ref 0–0.2)
BASOPHILS NFR BLD AUTO: 0 %
EOSINOPHIL # BLD AUTO: 0.3 K/UL (ref 0–0.7)
EOSINOPHIL NFR BLD AUTO: 4 %
ERYTHROCYTE [DISTWIDTH] IN BLOOD BY AUTOMATED COUNT: 4.64 M/UL (ref 4.3–5.9)
ERYTHROCYTE [DISTWIDTH] IN BLOOD: 14.2 % (ref 11.5–15.5)
HCT VFR BLD AUTO: 36.9 % (ref 39–53)
HGB BLD-MCNC: 12.3 GM/DL (ref 13–17.5)
LYMPHOCYTES # SPEC AUTO: 1.7 K/UL (ref 1–4.8)
LYMPHOCYTES NFR SPEC AUTO: 25 %
MCH RBC QN AUTO: 26.5 PG (ref 25–35)
MCHC RBC AUTO-ENTMCNC: 33.3 G/DL (ref 31–37)
MCV RBC AUTO: 79.5 FL (ref 80–100)
MONOCYTES # BLD AUTO: 0.5 K/UL (ref 0–1)
MONOCYTES NFR BLD AUTO: 7 %
NEUTROPHILS # BLD AUTO: 4.3 K/UL (ref 1.3–7.7)
NEUTROPHILS NFR BLD AUTO: 63 %
PLATELET # BLD AUTO: 155 K/UL (ref 150–450)
WBC # BLD AUTO: 6.8 K/UL (ref 3.8–10.6)

## 2020-04-18 RX ADMIN — CEFAZOLIN SCH: 330 INJECTION, POWDER, FOR SOLUTION INTRAMUSCULAR; INTRAVENOUS at 18:13

## 2020-04-18 RX ADMIN — CEFAZOLIN SCH MLS/HR: 330 INJECTION, POWDER, FOR SOLUTION INTRAMUSCULAR; INTRAVENOUS at 12:35

## 2020-04-18 RX ADMIN — DIPHENHYDRAMINE HYDROCHLORIDE PRN MG: 50 INJECTION, SOLUTION INTRAMUSCULAR; INTRAVENOUS at 12:36

## 2020-04-18 RX ADMIN — ASPIRIN SCH EACH: 325 TABLET ORAL at 12:29

## 2020-04-18 RX ADMIN — ASPIRIN SCH EACH: 325 TABLET ORAL at 18:13

## 2020-04-18 RX ADMIN — DIPHENHYDRAMINE HYDROCHLORIDE PRN MG: 50 INJECTION, SOLUTION INTRAMUSCULAR; INTRAVENOUS at 03:23

## 2020-04-18 RX ADMIN — SODIUM CHLORIDE SCH MLS/HR: 9 INJECTION, SOLUTION INTRAVENOUS at 03:23

## 2020-04-18 RX ADMIN — SODIUM CHLORIDE SCH MLS/HR: 9 INJECTION, SOLUTION INTRAVENOUS at 12:32

## 2020-04-18 NOTE — P.PN
Subjective


Progress Note Date: 04/18/20


Principal diagnosis: 





Left hand cellulitis 


Patient is a 24-year-old male with history of IV drug abuse tried to inject into

the vein.  Patient then developed swelling and erythema of his left hand with 

progressive pain and limited range of motion.


Patient has remained afebrile his blood cultures are negative to date he has 

been seen by orthopedic surgery and infectious disease and is currently on 

vancomycin states his pain is better his range of motion is improving








Objective





- Vital Signs


Vital signs: 


                                   Vital Signs











Temp  97.7 F   04/18/20 06:38


 


Pulse  59 L  04/18/20 08:00


 


Resp  14   04/18/20 08:00


 


BP  110/64   04/18/20 06:38


 


Pulse Ox  98   04/18/20 06:38








                                 Intake & Output











 04/17/20 04/18/20 04/18/20





 18:59 06:59 18:59


 


Intake Total  1550 


 


Balance  1550 


 


Intake:   


 


  Intake, IV Titration  1550 





  Amount   


 


    Sodium Chloride 0.9% 1,  1300 





    000 ml @ 130 mls/hr IV .   





    Q7H42M [facility] Rx#:[id number]   


 


    Vancomycin 1,250 mg In  250 





    Sodium Chloride 0.9% 250   





    ml @ 125 mls/hr IVPB Q8H   





    [facility] Rx#:[id number]   


 


Other:   


 


  Voiding Method Toilet Toilet Toilet


 


  # Voids 3 1 1














- Respiratory


Respiratory: bilateral: CTA





- Cardiovascular


Rhythm: regular





- Gastrointestinal


General gastrointestinal: Present: normal bowel sounds





- Musculoskeletal


Musculoskeletal Comment(s): 





Swelling and erythema of the left hand with improved range of motion





- Labs


CBC & Chem 7: 


                                 04/18/20 06:16





                                 04/18/20 06:16


Labs: 


                  Abnormal Lab Results - Last 24 Hours (Table)











  04/18/20 04/18/20 Range/Units





  06:16 06:16 


 


Hgb   12.3 L  (13.0-17.5)  gm/dL


 


Hct   36.9 L  (39.0-53.0)  %


 


MCV   79.5 L  (80.0-100.0)  fL


 


C-Reactive Protein  24.3 H   (<10.0)  mg/L








                      Microbiology - Last 24 Hours (Table)











 04/15/20 18:25 Blood Culture - Preliminary





 Blood    No Growth after 48 hours














Assessment and Plan


(1) Cellulitis of left hand


Narrative/Plan: 


Blood cultures of negative to date, continue IV vancomycin continue pain control


Current Visit: Yes   Status: Acute   Code(s): L03.114 - CELLULITIS OF LEFT UPPER

LIMB   SNOMED Code(s): 77952317

## 2020-04-18 NOTE — P.DS
Providers


Date of admission: 


04/17/20 14:47





Expected date of discharge: 04/18/20


Attending physician: 


Bree Jiménez MD





Consults: 





                                        





04/16/20 08:50


Consult Physician Routine 


   Consulting Provider: Kin Khan


   Consult Reason/Comments: left hand swelling


   Do you want consulting provider notified?: Already Contacted





04/16/20 10:01


Consult Physician Routine 


   Consulting Provider: Eliza Yeboah


   Consult Reason/Comments: cellulitis left hand


   Do you want consulting provider notified?: Yes











Primary care physician: 


Abhilash Hinkle








- Discharge Diagnosis(es)


(1) Cellulitis of left hand


Patient is a 24-year-old with history of IV drug use tried injecting oxycodone 

but missed the vein of his forearm.  Patient admitted that he has had Bactrim in

the past IV drug use.  Patient presented with 2 days of swelling and erythema of

his left hand with limited range of motion.


Current Visit: Yes   Status: Acute   


Hospital Course: 


Patient was started on IV vancomycin and he remained afebrile throughout his 

stay.  He was seen by orthopedic surgery and since patient wasn't responding to 

IV antibiotics managed the patient did not have an abscess and recommended 

continued conservative treatment.  He was also seen by pulmonary and infectious 

disease.  Patient pain improved his range of motion improved and his blood 

cultures remain negative.  So he was changed to Bactrim recommended to follow-up

with his family physician.  Patient did have a elevated TSH of 5.54 but a normal

free T4 recommend follow-up with his family physician.





The day of discharge she is alert oriented he had a temperature 98.7, pulse is 

62, [place] to 16, blood pressure 113/67 he was 97% on room air.  He was alert

and oriented he had less redness and swelling of his left hand he will be 

discharged on Bactrim double strength 1 tab every 12 hours for 7 days.








Time spent 32 minutes





Patient Condition at Discharge: Serious





Plan - Discharge Summary


Discharge Rx Participant: No


New Discharge Prescriptions: 


New


   Sulfamethox-Tmp 800-160Mg [Bactrim -160 mg] 1 tab PO Q12HR #14 tab





No Action


   lamoTRIgine 150 mg PO DAILY


   Buprenorphine HCl/Naloxone HCl [Zubsolv 5.7-1.4 mg Tablet Sl] 1 tab SL BID


Discharge Medication List





Buprenorphine HCl/Naloxone HCl [Zubsolv 5.7-1.4 mg Tablet Sl] 1 tab SL BID 

04/15/20 [History]


lamoTRIgine 150 mg PO DAILY 04/15/20 [History]


Sulfamethox-Tmp 800-160Mg [Bactrim -160 mg] 1 tab PO Q12HR #14 tab 

04/18/20 [Rx]








Follow up Appointment(s)/Referral(s): 


[facility] [Outside] - As Needed (Therapist --- Abhilash Spicer DO [Primary Care Provider] - 1-2 days


Discharge Disposition: HOME SELF-CARE

## 2020-04-18 NOTE — PN
PROGRESS NOTE



PULMONARY/CRITICAL CARE PROGRESS NOTE:



DATE OF SERVICE:

04/18/2020



This is a 24-year-old gentleman who was admitted with a diagnosis of left hand, wrist

and forearm cellulitis secondary to IV drug use and/or injection of patients own blood

into his own vein.  He does have a history of bipolar disorder, previous history of IV

drug abuse, and history of mild asthma for which he is not taking any medications

currently.  He is doing much better.  He remains on vancomycin.  He states that he

could be discharged home in the next 24 to 48 hours.  He has been seen by Infectious

Disease and also Orthopedic Surgery.



No surgical intervention is needed at this time.



He states his hand and wrist and forearm are feeling better.  His thumb is still quite

swollen and tender to palpation.



Current vital signs are reviewed, temperature is 97.7, heart rate 59, respiratory rate

14, blood pressure 110/64 mean 79, room air saturation 98%.  Appears in no acute

distress.

HEENT: Examination is grossly unremarkable.  Mucous membranes are moist.  No oral

lesions.

NECK:  Supple, full range of motion.  No adenopathy.  Neck veins are flat.

CARDIOVASCULAR: Examination reveals regular rhythm and rate.  Heart rate 60 beats per

minute.  S1, S2 normal.  No murmur.

LUNGS:  Reveal clear breath sounds.  No wheezes, rhonchi, or crackles.

ABDOMEN:  Soft, bowel sounds are heard.

EXTREMITIES:  Intact.  No cyanosis, clubbing, or edema.

SKIN: Without rash.

NEUROLOGIC: Examination is nonfocal.



Examination separately of the left forearm, elbow, wrist and hand reveals additional

swelling.  The swelling has receded a bit.  There is no lymphangitic streaking.  The

forearm and wrist are tender to palpation as is the left thumb.



Microbiology is still pending or negative.



LABS:

Reviewed.  White count 6.8, hemoglobin 12.3, hematocrit 36.9, platelet count is

155,000.  C-reactive protein is 24.3.



Labs are reviewed.



X-rays are reviewed.



ASSESSMENT:

1. Left hand, wrist and forearm cellulitis, presumably secondary to either use of IV

    drugs and/or injection of the patient's own blood.

2. History of bipolar disorder.

3. History of previous drug abuse.

4. History of mild asthma, not requiring treatment at this time.



PLAN:

The patient sees Dr. Ying as a primary.  He should follow up with Dr. Ying when

he is discharged.  I am not sure when discharge will be done.  Infectious disease will

decide about outpatient antibiotics.  Additional recommendations and suggestions are

forthcoming.  He is counseled about the importance of not injecting himself either with

IV drugs and/or his own blood.  He is obviously contaminating himself when he does

that.  Additional recommendations and suggestions are forthcoming.  The patient may

have some mild hypothyroidism.  Outpatient evaluation can take place on that issue.





MMODL / IJN: [id number] / Job#: [id number]

## 2020-05-15 ENCOUNTER — HOSPITAL ENCOUNTER (OUTPATIENT)
Dept: HOSPITAL 47 - CATHCVL | Age: 25
End: 2020-05-15
Attending: RADIOLOGY
Payer: MEDICAID

## 2020-05-15 VITALS
SYSTOLIC BLOOD PRESSURE: 129 MMHG | TEMPERATURE: 97.9 F | HEART RATE: 71 BPM | DIASTOLIC BLOOD PRESSURE: 67 MMHG | RESPIRATION RATE: 16 BRPM

## 2020-05-15 VITALS — BODY MASS INDEX: 19.5 KG/M2

## 2020-05-15 DIAGNOSIS — M86.9: ICD-10-CM

## 2020-05-15 DIAGNOSIS — Z45.2: Primary | ICD-10-CM

## 2020-05-15 PROCEDURE — 36410 VNPNXR 3YR/> PHY/QHP DX/THER: CPT

## 2020-05-15 PROCEDURE — 76937 US GUIDE VASCULAR ACCESS: CPT

## 2020-05-17 ENCOUNTER — HOSPITAL ENCOUNTER (EMERGENCY)
Dept: HOSPITAL 47 - EC | Age: 25
Discharge: HOME | End: 2020-05-17
Payer: MEDICAID

## 2020-05-17 VITALS — RESPIRATION RATE: 18 BRPM

## 2020-05-17 VITALS — HEART RATE: 78 BPM | DIASTOLIC BLOOD PRESSURE: 78 MMHG | SYSTOLIC BLOOD PRESSURE: 126 MMHG

## 2020-05-17 VITALS — TEMPERATURE: 98.3 F

## 2020-05-17 DIAGNOSIS — F31.9: ICD-10-CM

## 2020-05-17 DIAGNOSIS — M54.5: ICD-10-CM

## 2020-05-17 DIAGNOSIS — R50.9: ICD-10-CM

## 2020-05-17 DIAGNOSIS — Z88.1: ICD-10-CM

## 2020-05-17 DIAGNOSIS — D72.819: Primary | ICD-10-CM

## 2020-05-17 DIAGNOSIS — Z79.899: ICD-10-CM

## 2020-05-17 DIAGNOSIS — Z91.030: ICD-10-CM

## 2020-05-17 DIAGNOSIS — Z79.890: ICD-10-CM

## 2020-05-17 LAB
ALBUMIN SERPL-MCNC: 4.8 G/DL (ref 3.5–5)
ALP SERPL-CCNC: 129 U/L (ref 38–126)
ALT SERPL-CCNC: 18 U/L (ref 4–49)
ANION GAP SERPL CALC-SCNC: 15 MMOL/L
APTT BLD: 23 SEC (ref 22–30)
AST SERPL-CCNC: 35 U/L (ref 17–59)
BUN SERPL-SCNC: 18 MG/DL (ref 9–20)
CALCIUM SPEC-MCNC: 9.7 MG/DL (ref 8.4–10.2)
CELLS COUNTED: 100
CHLORIDE SERPL-SCNC: 97 MMOL/L (ref 98–107)
CO2 SERPL-SCNC: 23 MMOL/L (ref 22–30)
ERYTHROCYTE [DISTWIDTH] IN BLOOD BY AUTOMATED COUNT: 5.18 M/UL (ref 4.3–5.9)
ERYTHROCYTE [DISTWIDTH] IN BLOOD: 14.5 % (ref 11.5–15.5)
GLUCOSE SERPL-MCNC: 84 MG/DL (ref 74–99)
HCT VFR BLD AUTO: 43 % (ref 39–53)
HGB BLD-MCNC: 14.4 GM/DL (ref 13–17.5)
INR PPP: 1.4 (ref ?–1.2)
LYMPHOCYTES # BLD MANUAL: 0.25 K/UL (ref 1–4.8)
MCH RBC QN AUTO: 27.8 PG (ref 25–35)
MCHC RBC AUTO-ENTMCNC: 33.5 G/DL (ref 31–37)
MCV RBC AUTO: 83 FL (ref 80–100)
MONOCYTES # BLD MANUAL: 0.05 K/UL (ref 0–1)
NEUTROPHILS NFR BLD MANUAL: 70 %
NEUTS SEG # BLD MANUAL: 2.2 K/UL (ref 1.3–7.7)
PH UR: 6 [PH] (ref 5–8)
PLATELET # BLD AUTO: 103 K/UL (ref 150–450)
POTASSIUM SERPL-SCNC: 4 MMOL/L (ref 3.5–5.1)
PROT SERPL-MCNC: 8 G/DL (ref 6.3–8.2)
PROT UR QL: (no result)
PT BLD: 13.9 SEC (ref 9–12)
RBC UR QL: 2 /HPF (ref 0–5)
SODIUM SERPL-SCNC: 135 MMOL/L (ref 137–145)
SP GR UR: 1.02 (ref 1–1.03)
UROBILINOGEN UR QL STRIP: 2 MG/DL (ref ?–2)
WBC # BLD AUTO: 2.5 K/UL (ref 3.8–10.6)
WBC # UR AUTO: 1 /HPF (ref 0–5)

## 2020-05-17 PROCEDURE — 72132 CT LUMBAR SPINE W/DYE: CPT

## 2020-05-17 PROCEDURE — 85730 THROMBOPLASTIN TIME PARTIAL: CPT

## 2020-05-17 PROCEDURE — 85025 COMPLETE CBC W/AUTO DIFF WBC: CPT

## 2020-05-17 PROCEDURE — 87040 BLOOD CULTURE FOR BACTERIA: CPT

## 2020-05-17 PROCEDURE — 81001 URINALYSIS AUTO W/SCOPE: CPT

## 2020-05-17 PROCEDURE — 85610 PROTHROMBIN TIME: CPT

## 2020-05-17 PROCEDURE — 96360 HYDRATION IV INFUSION INIT: CPT

## 2020-05-17 PROCEDURE — 36415 COLL VENOUS BLD VENIPUNCTURE: CPT

## 2020-05-17 PROCEDURE — 87635 SARS-COV-2 COVID-19 AMP PRB: CPT

## 2020-05-17 PROCEDURE — 80053 COMPREHEN METABOLIC PANEL: CPT

## 2020-05-17 PROCEDURE — 71046 X-RAY EXAM CHEST 2 VIEWS: CPT

## 2020-05-17 PROCEDURE — 99284 EMERGENCY DEPT VISIT MOD MDM: CPT

## 2020-05-17 PROCEDURE — 83605 ASSAY OF LACTIC ACID: CPT

## 2020-05-17 RX ADMIN — NICARDIPINE HYDROCHLORIDE SCH MLS/HR: 2.5 INJECTION INTRAVENOUS at 20:01

## 2020-05-17 RX ADMIN — NICARDIPINE HYDROCHLORIDE SCH MLS/HR: 2.5 INJECTION INTRAVENOUS at 20:00

## 2020-05-17 NOTE — CT
EXAMINATION TYPE: CT lumbar spine w con

 

DATE OF EXAM: 5/17/2020

 

COMPARISON: None

 

HISTORY: back pain, fever. hx of recent cellulitis infection.

 

CT DLP: 665.6 mGycm

Automated exposure control for dose reduction was used.

 

CONTRAST: 

Performed with IV Contrast, patient injected with 100 mL of Isovue 300.

 

Multiple axial sections were obtained from the level of T10-S2 vertebra with intravenous contrast.

 

Lumbar vertebra have normal spacing and alignment. Posterior elements are intact. I see no focal bone
 destruction. There is no lumbar paraspinal mass. There is no spinal stenosis. Sacroiliac joints appe
ar intact. There is no pathologic enhancement.

 

IMPRESSION:

Negative CT scan of the lumbar spine. No evidence of an abscess. No evidence of osteomyelitis.

## 2020-05-17 NOTE — ED
General Adult HPI





- General


Chief complaint: Fever


Stated complaint: fever/sent by pcp


Time Seen by Provider: 05/17/20 18:27


Source: patient


Mode of arrival: ambulatory


Limitations: no limitations





- History of Present Illness


Initial comments: 


24-year-old male patient presents to the emergency department today for 

evaluation of fever.  Patient states for the last 3-4 days he has had low-grade 

fevers today spiked up to 106F.  Patient states he has had repeated infections 

over the last few weeks.  He states that he does have a history of IV drug use b

ut hasn't used in 2 years.  States he was admitted recently for cellulitis of 

the arm which did seem to resolve.  States he spiked fevers again a few days ago

and they're unable to find a source.  Patient denies any headache, neck pain, 

chest pain, cough, congestion, nausea, abdominal pain, vomiting, constipation, 

diarrhea.  Denies any rash.  Denies any hematuria, dysuria, urinary frequency, 

urinary urgency.  States he is having some intermittent low back pain.  He was 

sent in by Dr. Hinkle who is concerned for spinal abscess.  Patient denies any 

recent rash, chest pain, diarrhea, constipation, numbness, tingling, dizziness, 

weakness, visual changes, or any other complaints.








- Related Data


                                Home Medications











 Medication  Instructions  Recorded  Confirmed


 


Buprenorphine HCl/Naloxone HCl 1 tab SL BID 04/15/20 05/17/20





[Zubsolv 5.7-1.4 mg Tablet Sl]   


 


lamoTRIgine 150 mg PO DAILY 04/15/20 05/17/20


 


Levothyroxine Sodium [Synthroid] 75 mcg PO DAILY 05/14/20 05/17/20











                                    Allergies











Allergy/AdvReac Type Severity Reaction Status Date / Time


 


tree nut Allergy  Anaphylaxis Verified 05/17/20 19:30


 


vancomycin AdvReac  Per Verified 05/17/20 19:30





   patient  





   "Red man  





   syndrome".  





   Red dots,  





   hot  





   flashes,  





   and f  














Review of Systems


ROS Statement: 


Those systems with pertinent positive or pertinent negative responses have been 

documented in the HPI.





ROS Other: All systems not noted in ROS Statement are negative.





Past Medical History


Past Medical History: No Reported History


Additional Past Medical History / Comment(s): Asthma as a child, IV drug 

addiction, past "blood infection" from dental procedure, recent admission for 

infection left hand & arm from injection of oxycodone attempt


History of Any Multi-Drug Resistant Organisms: None Reported


Past Surgical History: Tonsillectomy


Additional Past Surgical History / Comment(s): Tonsils


Past Anesthesia/Blood Transfusion Reactions: No Reported Reaction


Past Psychological History: Bipolar


Smoking Status: Never smoker





- Past Family History


  ** Mother


Family Medical History: Hypertension


Additional Family Medical History / Comment(s): Kidney issue, OCD/Bipolar and 

other mental health issues





General Exam


Limitations: no limitations


General appearance: alert, in no apparent distress, other (This is a 

well-developed, well-nourished adult male patient in no acute distress.  Vital 

signs upon presentation are temperature 101.1F, pulse 142, respirations 18, 

blood pressure 126/68, pulse ox 99% on room air.)


Eye exam: Present: normal appearance, PERRL, EOMI.  Absent: scleral icterus, 

conjunctival injection, periorbital swelling


ENT exam: Present: normal exam, normal oropharynx, mucous membranes moist


Respiratory exam: Present: normal lung sounds bilaterally.  Absent: respiratory 

distress, wheezes, rales, rhonchi, stridor


Cardiovascular Exam: Present: regular rate, normal rhythm, normal heart sounds. 

 Absent: systolic murmur, diastolic murmur, rubs, gallop, clicks


GI/Abdominal exam: Present: soft, normal bowel sounds.  Absent: distended, 

tenderness, guarding, rebound, rigid


Back exam: Present: normal inspection, paraspinal tenderness (left lumbar).  

Absent: vertebral tenderness


Neurological exam: Present: alert, oriented X3, CN II-XII intact


Psychiatric exam: Present: normal affect, normal mood


Skin exam: Present: warm, dry, intact, normal color.  Absent: rash





Course


                                   Vital Signs











  05/17/20 05/17/20 05/17/20





  18:21 20:01 21:23


 


Temperature 101.1 F H 98.3 F 


 


Pulse Rate 142 H 103 H 95


 


Respiratory 18 15 18





Rate   


 


Blood Pressure 126/68 107/73 92/56


 


O2 Sat by Pulse 99 98 98





Oximetry   














Medical Decision Making





- Medical Decision Making


24-year-old male patient presents to the emergency department today for 

evaluation of fever for the last 4-5 days.  States temperature at home was as 

high as 106F today.  Physical examination was relatively unremarkable.  Lungs 

are clear to auscultation.  Abdomen soft and nontender.  Did have some mild left

 lumbar paraspinal tenderness.  Labs reviewed and did reveal decreased white 

blood cell count at 2.5, low lymphocyte count.  Remainder of labs are 

unremarkable.  Chest x-ray showed no acute abnormalities.  CT of the lumbar 

spine with contrast was obtained and showed no evidence for paraspinal abscess 

or bony destruction.  I did discuss findings and results with the patient. 

Patient does have a test scheduled in the morning. V/S are stable. Given current

 condition and close follow up with his primary care physician we will discharge

 home. Return parameters are discussed in detail. He verbalizes understanding 

and agrees with this plan. 








- Lab Data


Result diagrams: 


                                 05/17/20 19:30





                                 05/17/20 19:30


                                   Lab Results











  05/17/20 05/17/20 05/17/20 Range/Units





  19:30 19:30 19:30 


 


WBC  2.5 L    (3.8-10.6)  k/uL


 


RBC  5.18    (4.30-5.90)  m/uL


 


Hgb  14.4    (13.0-17.5)  gm/dL


 


Hct  43.0    (39.0-53.0)  %


 


MCV  83.0    (80.0-100.0)  fL


 


MCH  27.8    (25.0-35.0)  pg


 


MCHC  33.5    (31.0-37.0)  g/dL


 


RDW  14.5    (11.5-15.5)  %


 


Plt Count  103 L    (150-450)  k/uL


 


Neutrophils % (Manual)  70    %


 


Band Neutrophils %  18    %


 


Lymphocytes % (Manual)  10    %


 


Monocytes % (Manual)  2    %


 


Neutrophils # (Manual)  2.20    (1.3-7.7)  k/uL


 


Lymphocytes # (Manual)  0.25 L    (1.0-4.8)  k/uL


 


Monocytes # (Manual)  0.05    (0-1.0)  k/uL


 


Nucleated RBCs  0    (0-0)  /100 WBC


 


Manual Slide Review  Performed    


 


Toxic Vacuolation  Present    


 


RBC Morphology  Normal    


 


PT   13.9 H   (9.0-12.0)  sec


 


INR   1.4 H   (<1.2)  


 


APTT   23.0   (22.0-30.0)  sec


 


Sodium    135 L  (137-145)  mmol/L


 


Potassium    4.0  (3.5-5.1)  mmol/L


 


Chloride    97 L  ()  mmol/L


 


Carbon Dioxide    23  (22-30)  mmol/L


 


Anion Gap    15  mmol/L


 


BUN    18  (9-20)  mg/dL


 


Creatinine    0.94  (0.66-1.25)  mg/dL


 


Est GFR (CKD-EPI)AfAm    >90  (>60 ml/min/1.73 sqM)  


 


Est GFR (CKD-EPI)NonAf    >90  (>60 ml/min/1.73 sqM)  


 


Glucose    84  (74-99)  mg/dL


 


Plasma Lactic Acid Marshall     (0.7-2.0)  mmol/L


 


Calcium    9.7  (8.4-10.2)  mg/dL


 


Total Bilirubin    2.2 H  (0.2-1.3)  mg/dL


 


AST    35  (17-59)  U/L


 


ALT    18  (4-49)  U/L


 


Alkaline Phosphatase    129 H  ()  U/L


 


Total Protein    8.0  (6.3-8.2)  g/dL


 


Albumin    4.8  (3.5-5.0)  g/dL


 


Urine Color     


 


Urine Appearance     (Clear)  


 


Urine pH     (5.0-8.0)  


 


Ur Specific Gravity     (1.001-1.035)  


 


Urine Protein     (Negative)  


 


Urine Glucose (UA)     (Negative)  


 


Urine Ketones     (Negative)  


 


Urine Blood     (Negative)  


 


Urine Nitrite     (Negative)  


 


Urine Bilirubin     (Negative)  


 


Urine Urobilinogen     (<2.0)  mg/dL


 


Ur Leukocyte Esterase     (Negative)  


 


Urine RBC     (0-5)  /hpf


 


Urine WBC     (0-5)  /hpf


 


Urine Mucus     (None)  /hpf














  05/17/20 05/17/20 Range/Units





  19:30 19:30 


 


WBC    (3.8-10.6)  k/uL


 


RBC    (4.30-5.90)  m/uL


 


Hgb    (13.0-17.5)  gm/dL


 


Hct    (39.0-53.0)  %


 


MCV    (80.0-100.0)  fL


 


MCH    (25.0-35.0)  pg


 


MCHC    (31.0-37.0)  g/dL


 


RDW    (11.5-15.5)  %


 


Plt Count    (150-450)  k/uL


 


Neutrophils % (Manual)    %


 


Band Neutrophils %    %


 


Lymphocytes % (Manual)    %


 


Monocytes % (Manual)    %


 


Neutrophils # (Manual)    (1.3-7.7)  k/uL


 


Lymphocytes # (Manual)    (1.0-4.8)  k/uL


 


Monocytes # (Manual)    (0-1.0)  k/uL


 


Nucleated RBCs    (0-0)  /100 WBC


 


Manual Slide Review    


 


Toxic Vacuolation    


 


RBC Morphology    


 


PT    (9.0-12.0)  sec


 


INR    (<1.2)  


 


APTT    (22.0-30.0)  sec


 


Sodium    (137-145)  mmol/L


 


Potassium    (3.5-5.1)  mmol/L


 


Chloride    ()  mmol/L


 


Carbon Dioxide    (22-30)  mmol/L


 


Anion Gap    mmol/L


 


BUN    (9-20)  mg/dL


 


Creatinine    (0.66-1.25)  mg/dL


 


Est GFR (CKD-EPI)AfAm    (>60 ml/min/1.73 sqM)  


 


Est GFR (CKD-EPI)NonAf    (>60 ml/min/1.73 sqM)  


 


Glucose    (74-99)  mg/dL


 


Plasma Lactic Acid Marshall  2.1 H*   (0.7-2.0)  mmol/L


 


Calcium    (8.4-10.2)  mg/dL


 


Total Bilirubin    (0.2-1.3)  mg/dL


 


AST    (17-59)  U/L


 


ALT    (4-49)  U/L


 


Alkaline Phosphatase    ()  U/L


 


Total Protein    (6.3-8.2)  g/dL


 


Albumin    (3.5-5.0)  g/dL


 


Urine Color   Yellow  


 


Urine Appearance   Clear  (Clear)  


 


Urine pH   6.0  (5.0-8.0)  


 


Ur Specific Gravity   1.019  (1.001-1.035)  


 


Urine Protein   1+ H  (Negative)  


 


Urine Glucose (UA)   Negative  (Negative)  


 


Urine Ketones   Negative  (Negative)  


 


Urine Blood   Moderate H  (Negative)  


 


Urine Nitrite   Negative  (Negative)  


 


Urine Bilirubin   Negative  (Negative)  


 


Urine Urobilinogen   2.0  (<2.0)  mg/dL


 


Ur Leukocyte Esterase   Negative  (Negative)  


 


Urine RBC   2  (0-5)  /hpf


 


Urine WBC   1  (0-5)  /hpf


 


Urine Mucus   Rare H  (None)  /hpf














- Radiology Data


Radiology results: report reviewed, image reviewed


CT lumbar spine with contrast was obtained.  Report reviewed in its entirety.  

Impression by Dr. Chin shows negative computed tomography scan lumbar 

spine.  No evidence of an abscess.  No evidence of osteomyelitis.





Two-view x-ray of the chest is obtained.  Report is reviewed in its entirety.  

Impression by Dr. Chin shows normal chest.  No change.





Disposition


Clinical Impression: 


 Fever, Leukopenia





Disposition: HOME SELF-CARE


Condition: Good


Instructions (If sedation given, give patient instructions):  Fever in Adults 

(ED)


Additional Instructions: 


Increase fluids. Take Tylenol and Motrin for pain and fever control. Follow up 

with Dr. Hinkle as soon as possible. Return for any other new, worsening, or 

concerning symptoms. 


Is patient prescribed a controlled substance at d/c from ED?: No


Referrals: 


Abhilash Hinkle DO [Primary Care Provider] - 1-2 days


Time of Disposition: 21:17

## 2020-05-17 NOTE — XR
EXAMINATION TYPE: XR chest 2V

 

DATE OF EXAM: 5/17/2020

 

COMPARISON: 3/21/2017

 

HISTORY: Fever

 

TECHNIQUE: 2 views

 

FINDINGS: Heart and mediastinum are normal. Lungs are clear. Diaphragm is normal. Bony thorax appears
 normal.

 

IMPRESSION: Normal chest. No change.

## 2020-05-18 ENCOUNTER — HOSPITAL ENCOUNTER (OUTPATIENT)
Dept: HOSPITAL 47 - RADNMMAIN | Age: 25
Discharge: HOME | End: 2020-05-18
Attending: INTERNAL MEDICINE
Payer: MEDICAID

## 2020-05-18 DIAGNOSIS — M86.9: Primary | ICD-10-CM

## 2020-05-18 PROCEDURE — 76937 US GUIDE VASCULAR ACCESS: CPT

## 2020-05-18 NOTE — US
EXAMINATION TYPE: US guide vascular access

 

DATE OF EXAM: 5/18/2020

 

HISTORY: Needs IV access for white blood cell scan.

 

PROCEDURE:

 

Maximal barrier technique utilized.  The skin overlying the basilic vein was localized with ultrasoun
d and the vein was noted to be compressible and patent by ultrasound and ultrasound image was obtaine
d and submitted on patient's chart.  Under direct ultrasound guidance a 20-gauge Angiocath was advanc
ed into the vein and fixed in place.  Catheter was aspirated and flushed with sterile saline.  Hemost
asis achieved.  Catheter fixed in place.  No immediate complication.  

 

IMPRESSION: Ultrasound-guided venipuncture, this procedure performed by the undersigned.

## 2020-05-19 NOTE — NM
EXAMINATION TYPE: NM WBC whole body

 

DATE OF EXAM: 5/19/2020

 

COMPARISON: NONE

 

HISTORY: Osteomyelitis

 

TECHNIQUE:  Following administration of 10.1 mCi Tc99m Ceretec.  Images obtained 4 hour(s) and 22 colleen
r(s) post injection.

 

FINDINGS:

Normal physiological tracer activity is noted in the liver and spleen and in the bone marrow of the a
xial and appendicular skeleton.

Within the left forearm there are foci of uptake identified within the dorsum of the soft tissues lat
erally and peripherally and also within the proximal to mid forearm dorsally and medially.

 

IMPRESSION:

Uptake within the left forearm suggests soft tissue infection, correlate.